# Patient Record
Sex: MALE | Race: BLACK OR AFRICAN AMERICAN | NOT HISPANIC OR LATINO | Employment: OTHER | URBAN - METROPOLITAN AREA
[De-identification: names, ages, dates, MRNs, and addresses within clinical notes are randomized per-mention and may not be internally consistent; named-entity substitution may affect disease eponyms.]

---

## 2019-10-07 ENCOUNTER — TELEPHONE (OUTPATIENT)
Dept: PHARMACY | Facility: CLINIC | Age: 64
End: 2019-10-07

## 2019-10-07 NOTE — TELEPHONE ENCOUNTER
No answer/VM to inform patient that [facility] received prescription for Epclusa and prior authorization is required.  [facility] will be back in touch once insurance determination is received.

## 2019-10-11 NOTE — TELEPHONE ENCOUNTER
DOCUMENTATION ONLY:  Prior authorization for Epclusa approved from 10/11/2019 to 1/3/2020 x 12 weeks of treatment.   Case ID# [id number]     Co-pay: $7.84    Patient Assistance IS NOT required.     Forward to clinical pharmacist for consult & shipment.

## 2019-10-18 ENCOUNTER — TELEPHONE (OUTPATIENT)
Dept: PHARMACY | Facility: CLINIC | Age: 64
End: 2019-10-18

## 2019-10-18 NOTE — TELEPHONE ENCOUNTER
Initial Epclusa consult completed on 10/18. Epclusa will be shipped on 10/22 to arrive at patient's home on 10/23 via [employer]. $7 copay. Patient will start Epclusa on 10/24. Address confirmed, CC on file. Confirmed 2 patient identifiers - name and . Therapy Appropriate.     Epclusa 400/100mg- Take one tablet by mouth daily x 12 weeks  Counseling was reviewed:   1. Patient MUST take Epclusa at the SAME time every day.   2. Patient MUST avoid acid reducers without consulting with myself or provider first. Antacids are to be spaced out at least 4 hours apart from Epclusa.     3. Potential Side effects include: headaches and fatigue.   Headache: Patient may treat with OTC remedies. If Tylenol is used, dose should not exceed 2000mg per day.    4. Medication list reviewed. DDIs addressed or allergies noted. Patient MUST contact myself or provider prior to starting any new OTC, herbal, or prescription drugs to avoid potential DDIs.    DDI: Medication list reviewed and potential DDIs addressed.  -Epclusa and Lipitor. Increased risk of myalagia. Provider did not recommend to hold medication. Safe to continue. Encouraged the pt to increase fluid intake and monitor for muscle pain/weakness.    Discussed the importance of staying well hydrated while on therapy. Compliance stressed - patient to take missed doses as soon as remembered, but NOT to take 2 doses in one day. Patient will report questions or concerns to myself or practitioner. Patient verbalizes understanding. Patient plans to start Epclusa on 10/24.  Consultation included: indication; goals of treatment; administration; storage and handling; side effects; how to handle side effects; the importance of compliance; how to handle missed doses; the importance of laboratory monitoring; the importance of keeping all follow up appointments.  Patient understands to report any medication changes to [facility] and provider. All questions answered and addressed to patients  satisfaction. I will f/u with her in 1 week from start, [facility] to contact patient in 3 weeks for refills.

## 2019-11-14 ENCOUNTER — TELEPHONE (OUTPATIENT)
Dept: PHARMACY | Facility: CLINIC | Age: 64
End: 2019-11-14

## 2019-11-18 NOTE — TELEPHONE ENCOUNTER
Refill and f/u call for Epclusa (2 of 3) attempted. No answer on home/mobile phone, not able to LVM for call back. Will f/u if no call back.

## 2019-11-20 NOTE — TELEPHONE ENCOUNTER
Refill and f/u call for Epclusa (2 of 3) attempted. No answer on home/mobile phone, not able to LVM for call back. Will f/u if no call back.   - Will notify provider and send post card to address on file on next failed contact attempt.

## 2019-11-22 NOTE — TELEPHONE ENCOUNTER
Epclusa (2 of 3) refill attempted. NA, unable to LVM. [facility] contacted patient's relative, Lori Liu (phone number on file) - Lori agreed to give patient a message to call [facility]. Will f/u if no call back.   - Patient should be out of Epclusa medication if started as planned on 10/24/19.   - [facility] will notify provider and send post card to address on file.

## 2019-11-25 ENCOUNTER — TELEPHONE (OUTPATIENT)
Dept: PHARMACY | Facility: CLINIC | Age: 64
End: 2019-11-25

## 2019-12-02 ENCOUNTER — TELEPHONE (OUTPATIENT)
Dept: PHARMACY | Facility: CLINIC | Age: 64
End: 2019-12-02

## 2019-12-02 NOTE — TELEPHONE ENCOUNTER
Epclusa (2 of 3)  refill confirmed. We will ship Epclusa refill on 12/3 via [employer]ex to arrive on . $0.00 copay- 004. Confirmed 2 patient identifiers - name and .     Patient has 0 doses of Epclusa remaining and takes it around 9:00 am daily.  Pt reports they are not having any side effects so far. No new medications, no new allergies or health conditions reported at this time. All questions answered and addressed to patients satisfaction. Pt verbalized understanding.    - [facility] and provider have regained contact with patient; patient reached on [phone]. Patient admits to missing 1 week of Epclusa due to running out of medication. Patient advised of adherence importance and decreased effectiveness of medication with missed doses. Patient advised to ensure to contact [facility] when 7 doses of Epclusa are left on hand to ensure shipment is sent in time. Patient verbalized understanding.   - [facility] will notify provider of restart and will f/u with patient in 1 week.

## 2019-12-11 ENCOUNTER — TELEPHONE (OUTPATIENT)
Dept: PHARMACY | Facility: CLINIC | Age: 64
End: 2019-12-11

## 2019-12-11 NOTE — TELEPHONE ENCOUNTER
"Epclusa f/u - [facility] contacted patient to assure Epclusa medication adherence. Patient has not missed any doses int he past week, but did admit to taking 2 tablets every day - patient says "that's what the first box said to do". [facility] advised he is only supposed to be taking 1 tablet per day and that the first Epclusa fill said to take 1 tablet daily. Patient verbalized understanding and stated he "would cut back" to 1 tablet daily. [facility] will notify provider and f/u at refill call in 2 weeks.   "

## 2019-12-24 ENCOUNTER — TELEPHONE (OUTPATIENT)
Dept: PHARMACY | Facility: CLINIC | Age: 64
End: 2019-12-24

## 2019-12-24 NOTE — TELEPHONE ENCOUNTER
Epclusa (3 of 3) refill confirmed and reassessment complete. We will ship Epclusa refill on  via fedex to arrive on . $0.00 copay- 004. Confirmed 2 patient identifiers - name and . Therapy appropriate.     Patient is unsure of the number of doses of Epclusa remaining because he is not at home. Confirmed that patient is taking Epclusa just 1 pill once daily around 9am. He is no longer taking 2 tablets daily. Pt denies experiencing any sides effects. Patient denies missing any doses, but unable to confirm compliance since patient is unable to give an accurate on hand count. Stressed the importance of compliance to avoid drug resistance and achieve cure. Patient verbalized understanding. No new medications, no new allergies or health conditions reported at this time. Allergies reviewed and medication reconciliation complete (reviewed and documented in [facility] and [facility]).  Disease education reviewed (including transmission and prevention). Patient counseled on importance of maintaining adherence and keeping lab appointments which were scheduled. All questions answered and addressed to patients satisfaction. Advised to call [facility] and provider if any issues arise.  Pt verbalized understanding.

## 2020-02-19 PROBLEM — Z86.19 HISTORY OF HEPATITIS C: Status: ACTIVE | Noted: 2020-02-19

## 2021-03-23 PROBLEM — M1A.09X0 IDIOPATHIC CHRONIC GOUT OF MULTIPLE SITES WITHOUT TOPHUS: Status: ACTIVE | Noted: 2021-03-23

## 2021-03-23 PROBLEM — N17.9 ACUTE KIDNEY INJURY SUPERIMPOSED ON CHRONIC KIDNEY DISEASE: Status: ACTIVE | Noted: 2021-03-23

## 2021-03-23 PROBLEM — R60.0 BILATERAL LOWER EXTREMITY EDEMA: Status: ACTIVE | Noted: 2021-03-23

## 2021-03-23 PROBLEM — N18.9 ACUTE KIDNEY INJURY SUPERIMPOSED ON CHRONIC KIDNEY DISEASE: Status: ACTIVE | Noted: 2021-03-23

## 2021-03-23 PROBLEM — Z71.6 TOBACCO ABUSE COUNSELING: Status: ACTIVE | Noted: 2021-03-23

## 2021-03-23 PROBLEM — F12.10 TETRAHYDROCANNABINOL (THC) USE DISORDER, MILD, ABUSE: Status: ACTIVE | Noted: 2021-03-23

## 2021-03-23 PROBLEM — R79.89 ELEVATED BRAIN NATRIURETIC PEPTIDE (BNP) LEVEL: Status: ACTIVE | Noted: 2021-03-23

## 2021-03-23 PROBLEM — R94.31 PROLONGED QT INTERVAL: Status: ACTIVE | Noted: 2021-03-23

## 2021-03-23 PROBLEM — R06.09 DYSPNEA ON EXERTION: Status: ACTIVE | Noted: 2021-03-23

## 2021-03-23 PROBLEM — Z72.0 TOBACCO ABUSE: Status: ACTIVE | Noted: 2021-03-23

## 2021-03-23 PROBLEM — Z86.19 HISTORY OF HEPATITIS C: Status: RESOLVED | Noted: 2020-02-19 | Resolved: 2021-03-23

## 2021-03-23 PROBLEM — E78.2 MIXED HYPERLIPIDEMIA: Status: ACTIVE | Noted: 2021-03-23

## 2021-03-23 PROBLEM — J44.9 COPD (CHRONIC OBSTRUCTIVE PULMONARY DISEASE): Status: ACTIVE | Noted: 2021-03-23

## 2021-03-23 PROBLEM — D64.9 NORMOCYTIC ANEMIA: Status: ACTIVE | Noted: 2021-03-23

## 2021-03-23 PROBLEM — R09.89 PULMONARY VASCULAR CONGESTION: Status: ACTIVE | Noted: 2021-03-23

## 2021-03-24 PROBLEM — R79.89 ELEVATED TROPONIN: Status: ACTIVE | Noted: 2021-03-24

## 2021-03-24 PROBLEM — I47.20 V-TACH: Status: ACTIVE | Noted: 2021-03-24

## 2021-03-24 PROBLEM — F14.10 COCAINE ABUSE: Status: ACTIVE | Noted: 2021-03-24

## 2021-03-25 PROBLEM — R60.0 BILATERAL LOWER EXTREMITY EDEMA: Status: RESOLVED | Noted: 2021-03-23 | Resolved: 2021-03-25

## 2021-04-11 PROBLEM — I50.23 ACUTE ON CHRONIC SYSTOLIC CONGESTIVE HEART FAILURE: Status: ACTIVE | Noted: 2021-04-11

## 2021-04-11 PROBLEM — R06.02 SOB (SHORTNESS OF BREATH): Status: ACTIVE | Noted: 2021-03-23

## 2021-04-28 PROBLEM — I50.23 ACUTE ON CHRONIC SYSTOLIC CONGESTIVE HEART FAILURE: Status: RESOLVED | Noted: 2021-04-11 | Resolved: 2021-04-28

## 2021-04-30 ENCOUNTER — PATIENT OUTREACH (OUTPATIENT)
Dept: ADMINISTRATIVE | Facility: HOSPITAL | Age: 66
End: 2021-04-30

## 2021-05-16 PROBLEM — Z87.39 HISTORY OF GOUT: Status: ACTIVE | Noted: 2021-05-16

## 2021-05-16 PROBLEM — F10.120: Status: ACTIVE | Noted: 2021-05-16

## 2021-11-18 PROBLEM — E87.5 HYPERKALEMIA: Chronic | Status: ACTIVE | Noted: 2021-11-18

## 2021-11-18 PROBLEM — E79.0 HYPERURICEMIA: Chronic | Status: ACTIVE | Noted: 2021-11-18

## 2021-11-18 PROBLEM — E55.9 VITAMIN D DEFICIENCY: Chronic | Status: ACTIVE | Noted: 2021-11-18
